# Patient Record
Sex: MALE | Race: WHITE | Employment: OTHER | URBAN - METROPOLITAN AREA
[De-identification: names, ages, dates, MRNs, and addresses within clinical notes are randomized per-mention and may not be internally consistent; named-entity substitution may affect disease eponyms.]

---

## 2020-09-17 LAB
ALBUMIN SERPL-MCNC: NORMAL G/DL
ALP BLD-CCNC: NORMAL U/L
ALT SERPL-CCNC: NORMAL U/L
ANION GAP SERPL CALCULATED.3IONS-SCNC: NORMAL MMOL/L
AST SERPL-CCNC: NORMAL U/L
AVERAGE GLUCOSE: NORMAL
BILIRUB SERPL-MCNC: NORMAL MG/DL
BUN BLDV-MCNC: NORMAL MG/DL
CALCIUM SERPL-MCNC: NORMAL MG/DL
CHLORIDE BLD-SCNC: NORMAL MMOL/L
CHOLESTEROL, TOTAL: NORMAL
CHOLESTEROL/HDL RATIO: NORMAL
CO2: NORMAL
CREAT SERPL-MCNC: NORMAL MG/DL
GFR CALCULATED: NORMAL
GLUCOSE BLD-MCNC: NORMAL MG/DL
HBA1C MFR BLD: 5.7 %
HDLC SERPL-MCNC: NORMAL MG/DL
LDL CHOLESTEROL CALCULATED: NORMAL
NONHDLC SERPL-MCNC: NORMAL MG/DL
POTASSIUM SERPL-SCNC: NORMAL MMOL/L
PROSTATE SPECIFIC ANTIGEN: NORMAL
SODIUM BLD-SCNC: NORMAL MMOL/L
TOTAL PROTEIN: NORMAL
TRIGL SERPL-MCNC: NORMAL MG/DL
VLDLC SERPL CALC-MCNC: NORMAL MG/DL

## 2021-05-15 ENCOUNTER — HOSPITAL ENCOUNTER (OUTPATIENT)
Age: 86
Discharge: HOME | End: 2021-05-17
Payer: MEDICARE

## 2021-05-15 ENCOUNTER — HOSPITAL ENCOUNTER (OUTPATIENT)
Dept: GENERAL RADIOLOGY | Age: 86
Discharge: HOME | End: 2021-05-17
Payer: MEDICARE

## 2021-05-15 DIAGNOSIS — M54.50 RIGHT LOW BACK PAIN, UNSPECIFIED CHRONICITY, UNSPECIFIED WHETHER SCIATICA PRESENT: ICD-10-CM

## 2021-05-15 PROCEDURE — 72100 X-RAY EXAM L-S SPINE 2/3 VWS: CPT

## 2021-09-27 ENCOUNTER — NURSE TRIAGE (OUTPATIENT)
Dept: OTHER | Facility: CLINIC | Age: 86
End: 2021-09-27

## 2021-09-27 NOTE — TELEPHONE ENCOUNTER
Reason for Disposition   Weakness of a leg or foot (e.g., unable to bear weight, dragging foot)    Answer Assessment - Initial Assessment Questions  1. ONSET: \"When did the pain begin? \"          A couple of months ago after a fall - after writer looked it has been since April     2. LOCATION: \"Where does it hurt? \" (upper, mid or lower back)        R lower side of back, into hip, down butt, and down into leg to foot     3. SEVERITY: \"How bad is the pain? \"  (e.g., Scale 1-10; mild, moderate, or severe)    - MILD (1-3): doesn't interfere with normal activities     - MODERATE (4-7): interferes with normal activities or awakens from sleep     - SEVERE (8-10): excruciating pain, unable to do any normal activities                In morning 9/10 but currently 6/10 after 2 tylenol and 2 advil - taking about 6-8 a day due to pain     4. PATTERN: \"Is the pain constant? \" (e.g., yes, no; constant, intermittent)          Constant     5. RADIATION: \"Does the pain shoot into your legs or elsewhere? \"          Down butt, hip, and leg     6. CAUSE:  \"What do you think is causing the back pain? \"           A fall     7. BACK OVER[facility]:  [facility] lifting of heavy objects, strenuous work or exercise?\"        8. MEDICATIONS: \"What have you taken so far for the pain? \" (e.g., nothing, acetaminophen, NSAIDS)          Tylenol and advil all day     9. NEUROLOGIC SYMPTOMS: \"Do you have any weakness, numbness, or problems with bowel/bladder control? \"          States he has some numbness but can still walk. When he gets into car he has to back into the car and it is very hard     10. OTHER SYMPTOMS: \"Do you have any other symptoms? \" (e.g., fever, abdominal pain, burning with urination, blood in urine)             Denies     11. PREGNANCY: \"Is there any chance you are pregnant? \" (e.g., yes, no; LMP)        NA    Protocols used: BACK PAIN-ADULT-[place]    Received call from [address] at [facility] with Red Flag Complaint.     Brief description of

## 2021-09-28 ENCOUNTER — OFFICE VISIT (OUTPATIENT)
Dept: FAMILY MEDICINE CLINIC | Age: 86
End: 2021-09-28
Payer: MEDICARE

## 2021-09-28 VITALS
DIASTOLIC BLOOD PRESSURE: 62 MMHG | OXYGEN SATURATION: 97 % | TEMPERATURE: 97.9 F | HEIGHT: 66 IN | RESPIRATION RATE: 20 BRPM | SYSTOLIC BLOOD PRESSURE: 115 MMHG | BODY MASS INDEX: 27.48 KG/M2 | WEIGHT: 171 LBS | HEART RATE: 65 BPM

## 2021-09-28 DIAGNOSIS — M19.90 ARTHRITIS: ICD-10-CM

## 2021-09-28 DIAGNOSIS — M25.562 CHRONIC PAIN OF LEFT KNEE: ICD-10-CM

## 2021-09-28 DIAGNOSIS — I10 ESSENTIAL HYPERTENSION: Primary | ICD-10-CM

## 2021-09-28 DIAGNOSIS — Z95.820 S/P ANGIOPLASTY WITH STENT: ICD-10-CM

## 2021-09-28 DIAGNOSIS — F32.1 CURRENT MODERATE EPISODE OF MAJOR DEPRESSIVE DISORDER WITHOUT PRIOR EPISODE (HCC): ICD-10-CM

## 2021-09-28 DIAGNOSIS — Z87.891 HISTORY OF SMOKING 30 OR MORE PACK YEARS: ICD-10-CM

## 2021-09-28 DIAGNOSIS — I65.23 ASYMPTOMATIC BILATERAL CAROTID ARTERY STENOSIS: ICD-10-CM

## 2021-09-28 DIAGNOSIS — R45.89 RISK FOR SUICIDE: ICD-10-CM

## 2021-09-28 DIAGNOSIS — E78.49 OTHER HYPERLIPIDEMIA: ICD-10-CM

## 2021-09-28 DIAGNOSIS — Z91.81 AT HIGH RISK FOR FALLS: ICD-10-CM

## 2021-09-28 DIAGNOSIS — R73.03 PREDIABETES: ICD-10-CM

## 2021-09-28 DIAGNOSIS — G89.29 CHRONIC PAIN OF LEFT KNEE: ICD-10-CM

## 2021-09-28 DIAGNOSIS — H90.3 SENSORINEURAL HEARING LOSS (SNHL) OF BOTH EARS: ICD-10-CM

## 2021-09-28 DIAGNOSIS — M79.604 RIGHT LEG PAIN: ICD-10-CM

## 2021-09-28 PROBLEM — C44.90 SKIN CANCER: Status: ACTIVE | Noted: 2021-09-28

## 2021-09-28 PROBLEM — M17.11 OSTEOARTHRITIS OF RIGHT KNEE: Status: ACTIVE | Noted: 2021-09-28

## 2021-09-28 PROBLEM — C44.90 SKIN CANCER: Status: RESOLVED | Noted: 2021-09-28 | Resolved: 2021-09-28

## 2021-09-28 PROCEDURE — G0008 ADMIN INFLUENZA VIRUS VAC: HCPCS | Performed by: STUDENT IN AN ORGANIZED HEALTH CARE EDUCATION/TRAINING PROGRAM

## 2021-09-28 PROCEDURE — 90694 VACC AIIV4 NO PRSRV 0.5ML IM: CPT | Performed by: STUDENT IN AN ORGANIZED HEALTH CARE EDUCATION/TRAINING PROGRAM

## 2021-09-28 PROCEDURE — 99204 OFFICE O/P NEW MOD 45 MIN: CPT | Performed by: STUDENT IN AN ORGANIZED HEALTH CARE EDUCATION/TRAINING PROGRAM

## 2021-09-28 RX ORDER — ATORVASTATIN CALCIUM 40 MG/1
TABLET, FILM COATED ORAL
COMMUNITY
Start: 2021-07-22 | End: 2021-09-28

## 2021-09-28 RX ORDER — CLOPIDOGREL BISULFATE 75 MG/1
TABLET ORAL
COMMUNITY
Start: 2020-12-08 | End: 2021-09-28

## 2021-09-28 RX ORDER — CLOPIDOGREL BISULFATE 75 MG/1
75 TABLET ORAL DAILY
COMMUNITY

## 2021-09-28 RX ORDER — ASPIRIN 81 MG/1
81 TABLET, CHEWABLE ORAL DAILY
COMMUNITY

## 2021-09-28 RX ORDER — CLOPIDOGREL BISULFATE 75 MG/1
TABLET ORAL
COMMUNITY
Start: 2021-07-22 | End: 2021-09-28

## 2021-09-28 RX ORDER — ATORVASTATIN CALCIUM 80 MG/1
80 TABLET, FILM COATED ORAL NIGHTLY
COMMUNITY
Start: 2020-12-08 | End: 2021-09-28

## 2021-09-28 RX ORDER — ATORVASTATIN CALCIUM 80 MG/1
80 TABLET, FILM COATED ORAL DAILY
COMMUNITY

## 2021-09-28 ASSESSMENT — COLUMBIA-SUICIDE SEVERITY RATING SCALE - C-SSRS
6. HAVE YOU EVER DONE ANYTHING, STARTED TO DO ANYTHING, OR PREPARED TO DO ANYTHING TO END YOUR LIFE?: NO
5. HAVE YOU STARTED TO WORK OUT OR WORKED OUT THE DETAILS OF HOW TO KILL YOURSELF? DO YOU INTEND TO CARRY OUT THIS PLAN?: YES
1. WITHIN THE PAST MONTH, HAVE YOU WISHED YOU WERE DEAD OR WISHED YOU COULD GO TO SLEEP AND NOT WAKE UP?: YES
2. HAVE YOU ACTUALLY HAD ANY THOUGHTS OF KILLING YOURSELF?: YES
3. HAVE YOU BEEN THINKING ABOUT HOW YOU MIGHT KILL YOURSELF?: NO
4. HAVE YOU HAD THESE THOUGHTS AND HAD SOME INTENTION OF ACTING ON THEM?: NO

## 2021-09-28 ASSESSMENT — PATIENT HEALTH QUESTIONNAIRE - PHQ9
4. FEELING TIRED OR HAVING LITTLE ENERGY: 3
5. POOR APPETITE OR OVEREATING: 0
9. THOUGHTS THAT YOU WOULD BE BETTER OFF DEAD, OR OF HURTING YOURSELF: 1
SUM OF ALL RESPONSES TO PHQ9 QUESTIONS 1 & 2: 3
3. TROUBLE FALLING OR STAYING ASLEEP: 0
2. FEELING DOWN, DEPRESSED OR HOPELESS: 3
10. IF YOU CHECKED OFF ANY PROBLEMS, HOW DIFFICULT HAVE THESE PROBLEMS MADE IT FOR YOU TO DO YOUR WORK, TAKE CARE OF THINGS AT HOME, OR GET ALONG WITH OTHER PEOPLE: 3
7. TROUBLE CONCENTRATING ON THINGS, SUCH AS READING THE NEWSPAPER OR WATCHING TELEVISION: 0
6. FEELING BAD ABOUT YOURSELF - OR THAT YOU ARE A FAILURE OR HAVE LET YOURSELF OR YOUR FAMILY DOWN: 0
1. LITTLE INTEREST OR PLEASURE IN DOING THINGS: 0
SUM OF ALL RESPONSES TO PHQ QUESTIONS 1-9: 8
SUM OF ALL RESPONSES TO PHQ QUESTIONS 1-9: 8
8. MOVING OR SPEAKING SO SLOWLY THAT OTHER PEOPLE COULD HAVE NOTICED. OR THE OPPOSITE, BEING SO FIGETY OR RESTLESS THAT YOU HAVE BEEN MOVING AROUND A LOT MORE THAN USUAL: 1
SUM OF ALL RESPONSES TO PHQ QUESTIONS 1-9: 7

## 2021-09-28 ASSESSMENT — ENCOUNTER SYMPTOMS
BLOOD IN STOOL: 0
BACK PAIN: 1
COUGH: 0
WHEEZING: 0
CONSTIPATION: 0
NAUSEA: 0
SHORTNESS OF BREATH: 0
DIARRHEA: 0
ABDOMINAL PAIN: 0

## 2021-09-28 NOTE — ASSESSMENT & PLAN NOTE
Uncontrolled, lifestyle modifications recommended and Recommended wearing hearing aid this may contribute to patient's loneliness as he is unable to communicate in crowded areas

## 2021-09-28 NOTE — ASSESSMENT & PLAN NOTE
Uncontrolled, s/p fall in May patient was continued right leg lateral pain increased with movements, worse in the morning, some degeneration in spine and has had 2 injections without resolution, on exam appears to be related to tensor fascia nilton, stretches given and patient sent to PT, will get x-ray of femur and hip, patient may possibly need further imaging for possible osteoporosis, contributes to his risk for falls, follow-up in 4 weeks

## 2021-09-28 NOTE — ASSESSMENT & PLAN NOTE
Patient is 80years old with some chronic degeneration in his knees and his back, currently using over-the-counter NSAID and Tylenol, good liver and kidney function in May 2021, physical therapy for fall prevention as well as general strengthening should be helpful, will follow up in 4 weeks

## 2021-09-28 NOTE — PATIENT INSTRUCTIONS
Heat    Applying heat to your painful TFL muscles through the use of a heat pack may help to alleviate the pain. Heat is an inexpensive, effective form of pain relief that works by increases blood flow to the area, relaxing muscles and increasing range of movement and flexibility. By increasing the circulation and blood flow throughout the area, injury healing properties are delivered to the muscles, aiding in repair, and reducing the symptoms of TFL pain. Stretching and Strengthening  Initially the best form of relief from TFL pain includes resting from aggravating activities. Gentle exercise, stretching and strengthening is important in assist to relieve pain and can help speed up your recovery. Gentle stretching may decrease the pain associated with TFL pain by encouraging circulation and blood flow to tissues, reducing muscle stiffness and spasm. Secondly, correcting muscle imbalances and building strength in the hip region is important in helping to reduce the demands and stress on the TFL, and subsequently reducing TFL pain. Your Soft Tissue Occupational Therapist may recommend a stretching and strengthening regime, which you will be able to do at home. It is best to consult your Soft Tissue Occupational Therapist before engaging in this program to avoid further aggravating your TFL pain. Stretches that may help to relieve TFL pain includes: For your leg pain I rita you may have Tensor [place] strain please go to physical therapy and try these stretches at home    TFL stork standing stretch: To do this stretch, stand beside the wall and place your hand on the wall to support the body. Raise the leg that is closest to the wall and bend the knee and place it on the other leg right above the knee to be in a stork standing position. Place your other hand over the TFL that you are going to stretch. Then while standing on one leg, slowly bend toward the wall. Keep your leg still and bend the torso.  You should feel tension force over your TFL. Hold it for 15 - 20 seconds. Outer hip stretch:  Lie down on the floor on your back. Cross your left foot over the right knee. Keep your left knee bent. Then using your right hand, pull and push your left knee across your body. It is highly important to keep your left shoulder on the floor. Hold that position for 10 - 20 seconds. Patient Education        Learning About Making a Suicide Safety Plan  Overview     A safety plan is a set of steps you can take when you feel suicidal. It includes your warning signs, coping strategies, and people to ask for support. You can write your own safety plan or use a free phone suki. But it's best to work with a therapist to make your plan. How can you make and use your plan? If you feel like you can't keep from hurting yourself or someone else, get help right away. Call [employer] or the National Suicide Prevention Lifeline: 3-333-400-TALK (5-112-572--677-522-8706). Or text HOME to 689238 to access the Crisis Text Line. Having a safety plan is important for anyone who thinks about suicide. It can help you (or someone you care about) get safely through times of crisis. If possible, try to make your plan during a time when you aren't in a crisis so it's ready when you need it. To use the plan, move through it step-by-step. So first, check your warning signs. Then try your own coping strategies. If those don't help, move through the rest of the steps until you have the help you need to get through the crisis. Here's how to make a safety plan. 1. Make a list of your crisis warning signs. What happens when you start to think about suicide? Make a list of your warning signs--the things you think, feel, or do when you start to feel suicidal.  2. List your personal coping strategies. What can you do or think about to avoid acting when you feel suicidal? This may include your reason(s) to live. Rank these ideas by how well you think they'll work. for you. Watch closely for changes in your health, and be sure to contact your doctor if you have any problems. Where can you learn more? Go to https://chpepiceweb.[email or web address] org and sign in to your [employer] account. Enter l123 in the [employer] box to learn more about \"Learning About Making a Suicide Safety Plan. \"     If you do not have an account, please click on the \"Sign Up Now\" link. Current as of: June 17, 2021               Content Version: 13.0  © 2006-2021 [employer] Care instructions adapted under license by [facility] ([facility]). If you have questions about a medical condition or this instruction, always ask your healthcare professional. [address] any warranty or liability for your use of this information. Patient Education        Preventing Falls: Care Instructions  Your Care Instructions     Getting around your home safely can be a challenge if you have injuries or health problems that make it easy for you to fall. Loose rugs and furniture in walkways are among the dangers for many older people who have problems walking or who have poor eyesight. People who have conditions such as arthritis, osteoporosis, or dementia also have to be careful not to fall. You can make your home safer with a few simple measures. Follow-up care is a key part of your treatment and safety. Be sure to make and go to all appointments, and call your doctor if you are having problems. It's also a good idea to know your test results and keep a list of the medicines you take. How can you care for yourself at home? Taking care of yourself  · You may get dizzy if you do not drink enough water. To prevent dehydration, drink plenty of fluids. Choose water and other clear liquids. If you have kidney, heart, or liver disease and have to limit fluids, talk with your doctor before you increase the amount of fluids you drink.   · Exercise regularly to improve your strength, muscle tone, and balance. Walk if you can. Swimming may be a good choice if you cannot walk easily. · Have your vision and hearing checked each year or any time you notice a change. If you have trouble seeing and hearing, you might not be able to avoid objects and could lose your balance. · Know the side effects of the medicines you take. Ask your doctor or pharmacist whether the medicines you take can affect your balance. Sleeping pills or sedatives can affect your balance. · Limit the amount of alcohol you drink. Alcohol can impair your balance and other senses. · Ask your doctor whether calluses or corns on your feet need to be removed. If you wear loose-fitting shoes because of calluses or corns, you can lose your balance and fall. · Talk to your doctor if you have numbness in your feet. Preventing falls at home  · Remove raised doorway thresholds, throw rugs, and clutter. Repair loose carpet or raised areas in the floor. · Move furniture and electrical cords to keep them out of walking paths. · Use nonskid floor wax, and wipe up spills right away, especially on ceramic tile floors. · If you use a walker or cane, put rubber tips on it. If you use crutches, clean the bottoms of them regularly with an abrasive pad, such as steel wool. · Keep your house well lit, especially [place], and outside walkways. Use night-lights in areas such as hallways and bathrooms. Add extra light switches or use remote switches (such as switches that go on or off when you clap your hands) to make it easier to turn lights on if you have to get up during the night. · Install sturdy handrails on stairways. · Move items in your cabinets so that the things you use a lot are on the lower shelves (about waist level). · Keep a cordless phone and a flashlight with new batteries by your bed. If possible, put a phone in each of the main rooms of your house, or carry a cell phone in case you fall and cannot reach a phone. Or, you can wear a device around your neck or wrist. You push a button that sends a signal for help. · Wear low-heeled shoes that fit well and give your feet good support. Use footwear with nonskid soles. Check the heels and soles of your shoes for wear. Repair or replace worn heels or soles. · Do not wear socks without shoes on wood floors. · Walk on the grass when the sidewalks are slippery. If you live in an area that gets snow and ice in the winter, sprinkle salt on slippery steps and sidewalks. Preventing falls in the bath  · Install grab bars and nonskid mats inside and outside your shower or tub and near the toilet and sinks. · Use shower chairs and bath benches. · Use a hand-held shower head that will allow you to sit while showering. · Get into a tub or shower by putting the weaker leg in first. Get out of a tub or shower with your strong side first.  · Repair loose toilet seats and consider installing a raised toilet seat to make getting on and off the toilet easier. · Keep your bathroom door unlocked while you are in the shower. Where can you learn more? Go to https://[employer]. org and sign in to your [employer] account. Enter [id number] [id number] [phone] in the [facility] box to learn more about \"Preventing Falls: Care Instructions. \"     If you do not have an account, please click on the \"Sign Up Now\" link. Current as of: December 7, 2020               Content Version: 13.0  © 0717-4020 Healthwise, [employer] Care instructions adapted under license by [facility] ([facility]). If you have questions about a medical condition or this instruction, always ask your healthcare professional. [address] any warranty or liability for your use of this information.

## 2021-09-28 NOTE — ASSESSMENT & PLAN NOTE
Patient will be sent for physical therapy, follow-up prevention education provided and after visit summary

## 2021-09-28 NOTE — ASSESSMENT & PLAN NOTE
Well-controlled, Currently taking any medications and blood pressure is at goal of less than 140 over less than 90

## 2021-09-28 NOTE — PROGRESS NOTES
Bella Norton (:  3/4/1933) is a 80 y.o. male, New patient , here for evaluation of the following:  Establish Care (patient wants to discuss pain in his right leg from a fall. Pt states whole right side is in pain )        ASSESSMENT/PLAN    Patient does go through the [facility] and get some of his medications filled through there, he did have labs in May as are outlined in the HPI    1. Essential hypertension  Assessment & Plan:   Well-controlled, Currently taking any medications and blood pressure is at goal of less than 140 over less than 90  2. Other hyperlipidemia  Assessment & Plan:   Well-controlled, continue current medications  3. History of smoking 30 or more pack years  4. Sensorineural hearing loss (SNHL) of both ears  Assessment & Plan:   Uncontrolled, lifestyle modifications recommended and Recommended wearing hearing aid this may contribute to patient's loneliness as he is unable to communicate in crowded areas  5. Asymptomatic bilateral carotid artery stenosis  Assessment & Plan:   Well-controlled, Status post right carotid endarterectomy, no current symptoms today  6. Arthritis  Assessment & Plan:   Patient is 80years old with some chronic degeneration in his knees and his back, currently using over-the-counter NSAID and Tylenol, good liver and kidney function in May 2021, physical therapy for fall prevention as well as general strengthening should be helpful, will follow up in 4 weeks  7. At high risk for falls  Assessment & Plan:  Patient will be sent for physical therapy, follow-up prevention education provided and after visit summary   Orders:  -     [facility] - Physical Therapy, [place]  8.  Current moderate episode of major depressive disorder without prior episode [facility])  Assessment & Plan:   Uncontrolled, lifestyle modifications recommended and Patient declines medication notes this is secondary to his not being able to get around and the continued pain in his leg, discussed depression and suicide with this patient, he does sometimes think about taking his own life and has a plan to swim for out into the water, notes he will not complete this plan due to family, safety plan in place and information provided to patient and after visit summary, continue to discuss to each visit and possibly consider medication in the future, declines counseling  9. Right leg pain  Assessment & Plan:   Uncontrolled, s/p fall in May patient was continued right leg lateral pain increased with movements, worse in the morning, some degeneration in spine and has had 2 injections without resolution, on exam appears to be related to tensor fascia nilton, stretches given and patient sent to PT, will get x-ray of femur and hip, patient may possibly need further imaging for possible osteoporosis, contributes to his risk for falls, follow-up in 4 weeks  Orders:  -     XR FEMUR RIGHT (MIN 2 VIEWS); Future  -     XR HIP 2-3 VW W PELVIS RIGHT; Future  -     [facility] - Physical Therapy, [place]  10. Risk for suicide  Assessment & Plan:   Unclear control, Safety plan created today  11. Prediabetes  12. Chronic pain of left knee  13. S/P angioplasty with stent    Return in about 4 weeks (around 10/26/2021) for Follow up chronic disease hip pain, mood, fall risk. Subjective   SUBJECTIVE/OBJECTIVE:  HPI    5 months ago fall onto feet from truck bed since then rigth sided pain down lateral leg and he feels like its in his bone. He can hardly walk. He took 2 OTC pain relievers. He usally takes NSAID and tylenol 2 of each and he can get around better after an hour. He has a hard time getting comfortable at [place]. When he rolls over pain is so bad he tries not to. He has had not further falls. No PT. He had 2 spinal injections by Dr Juan Jean.      Review of patient records from the [place] he does have labs from 5/7/2021, A1c of 5.7, microalbumin creatinine ratio of 28, CBC showing normal white blood cells, red blood cells, platelets, liver function within normal limits, electrolytes within normal limits, fasting sugar 87, normal kidney function, GFR at 80, cholesterol shows total cholesterol at 134, HDL at 42, LDL at 83, TSH within normal limits, urinalysis without blood or protein, blood pressure from visit in May was 118/68 is have sensorineural hearing loss, stye exam was in July does have smoking history quit more than 1 5years ago    Declined preventative screening identified as care gaps unless ordered through this visit    PHQ-2 Score: 3  PHQ-2 Over the past 2 weeks, how often have you been bothered by any of the following problems? Little interest or pleasure in doing things: Not at all  Feeling down, depressed, or hopeless: Nearly every day  PHQ-2 Score: 3   PHQ-9 Total Score: 8 (9/28/2021 10:30 AM)  Thoughts that you would be better off dead, or of hurting yourself in some way: 1 (9/28/2021 10:30 AM)       Past Medical History:  has a past medical history of Carotid artery stenosis and Skin cancer. Past Surgical History:  has a past surgical history that includes shoulder surgery and knee surgery. Social History:    Family History: family history is not on file. Allergies: Meclizine  Medications:   Current Outpatient Medications   Medication Sig Dispense Refill    atorvastatin (LIPITOR) 80 MG tablet Take 80 mg by mouth daily      clopidogrel (PLAVIX) 75 MG tablet Take 75 mg by mouth daily      aspirin 81 MG chewable tablet Take 81 mg by mouth daily       No current facility-administered medications for this visit. Allergies: Meclizine     Review of Systems   Constitutional: Negative for chills, fatigue, fever and unexpected weight change. HENT: Positive for hearing loss. Eyes: Negative for visual disturbance. Respiratory: Negative for cough, shortness of breath and wheezing. Cardiovascular: Negative for chest pain, palpitations and leg swelling.    Gastrointestinal: Negative for abdominal pain, blood in stool, constipation, diarrhea and nausea. Genitourinary: Negative for dysuria. Musculoskeletal: Positive for arthralgias, back pain and gait problem. Neurological: Negative for weakness, light-headedness, numbness and headaches. Psychiatric/Behavioral: Positive for dysphoric mood and suicidal ideas. Negative for confusion and sleep disturbance. The patient is not nervous/anxious. Objective   /62 (Site: Left Upper Arm, Position: Sitting, Cuff Size: Medium Adult)   Pulse 65   Temp 97.9 °F (36.6 °C) (Temporal)   Resp 20   Ht 5' 6\" (1.676 m)   Wt 171 lb (77.6 kg)   SpO2 97%   BMI 27.60 kg/m²         EXAMINATION:   THREE XRAY VIEWS OF THE LUMBAR SPINE       5/15/2021 10:02 am       COMPARISON:   None.       HISTORY:   ORDERING SYSTEM PROVIDED HISTORY: Right low back pain, unspecified   chronicity, unspecified whether sciatica present       FINDINGS:   There is some sclerotic degenerative changes about the hip joints bilaterally   with mild joint space narrowing on the right.  There is mild rotoscoliosis in   the lumbar spine.       Vertebral body height and alignment are unremarkable.  There is moderate disc   space narrowing from L2-3 through L4-5.  There is aortic vascular plaque.           Impression   Moderate degenerative changes mid lumbar spine without acute process         Physical Exam  Constitutional:       General: He is not in acute distress. Appearance: Normal appearance. HENT:      Head: Normocephalic and atraumatic. Right Ear: External ear normal.      Nose: Nose normal.      Mouth/Throat:      Mouth: Mucous membranes are moist.   Eyes:      Extraocular Movements: Extraocular movements intact. Conjunctiva/sclera: Conjunctivae normal.   Cardiovascular:      Rate and Rhythm: Normal rate and regular rhythm. Heart sounds: No murmur heard. Pulmonary:      Effort: Pulmonary effort is normal.      Breath sounds: Normal breath sounds. No wheezing.    Musculoskeletal: Cervical back: Normal range of motion and neck supple. Right upper leg: Tenderness present. No swelling, edema or deformity. Left knee: Deformity present. No swelling. Decreased range of motion. Tenderness present. Comments: On physical exam patient without increased pain with knee-to-chest on right or left or with figure 4 , pain with lying flat, equal strength of hip, knee, ankle, toes right and left lower extremities, neurovascularly intact   Lymphadenopathy:      Cervical: No cervical adenopathy. Neurological:      General: No focal deficit present. Mental Status: He is alert. Cranial Nerves: No cranial nerve deficit. Sensory: No sensory deficit. Motor: No weakness. Gait: Gait abnormal.   Psychiatric:         Attention and Perception: Attention and perception normal.         Mood and Affect: Mood normal.         Speech: Speech normal.         Behavior: Behavior normal. Behavior is cooperative. Thought Content: Thought content normal.         Cognition and Memory: Cognition normal.         Judgment: Judgment normal.             An electronic signature was used to authenticate this note. --Srinivas Ludwig MD       *NOTE: This report was transcribed using voice recognition software. Every effort was made to ensure accuracy; however, inadvertent computerized transcription errors may be present. On the basis of positive falls risk screening, assessment and plan is as follows: referral to physical therapy provided for strength and balance training.

## 2021-09-28 NOTE — ASSESSMENT & PLAN NOTE
Uncontrolled, lifestyle modifications recommended and Patient declines medication notes this is secondary to his not being able to get around and the continued pain in his leg, discussed depression and suicide with this patient, he does sometimes think about taking his own life and has a plan to swim for out into the water, notes he will not complete this plan due to family, safety plan in place and information provided to patient and after visit summary, continue to discuss to each visit and possibly consider medication in the future, declines counseling

## 2021-10-01 ENCOUNTER — TELEPHONE (OUTPATIENT)
Dept: FAMILY MEDICINE CLINIC | Age: 86
End: 2021-10-01

## 2021-10-01 NOTE — TELEPHONE ENCOUNTER
Pt called and would like a call back on his x-rays that he had done on 9/28/21 , please result and notify pt , thanks .

## 2021-10-04 NOTE — TELEPHONE ENCOUNTER
Addended by: FORREST BOO on: 1/19/2018 08:21 AM     Modules accepted: Orders     Pt called and notified voiced understanding

## 2021-10-05 ENCOUNTER — EVALUATION (OUTPATIENT)
Dept: PHYSICAL THERAPY | Age: 86
End: 2021-10-05
Payer: MEDICARE

## 2021-10-05 DIAGNOSIS — M79.604 RIGHT LEG PAIN: ICD-10-CM

## 2021-10-05 DIAGNOSIS — Z91.81 AT HIGH RISK FOR FALLS: Primary | ICD-10-CM

## 2021-10-05 PROCEDURE — 97161 PT EVAL LOW COMPLEX 20 MIN: CPT | Performed by: PHYSICAL THERAPIST

## 2021-10-05 NOTE — PROGRESS NOTES
[facility]          Phone: [phone] Fax: [phone]    Physical Therapy Daily Treatment Note  Date:  10/5/2021    Patient Name:  John Ho    :  3/4/1933  MRN: [id number]    Restrictions/Precautions:    Diagnosis:     Diagnosis Orders   1. At high risk for falls     2.  Right leg pain       Treatment Diagnosis:    Insurance/Certification information:  Medicare  10/5/21 to 22  Referring Physician:  Raghu Fuller MD  Plan of care signed (Y/N):    Visit# / total visits:    Pain level: 7/10   Time In:  1335  Time Out:  1405    Subjective:  See evaluation    Exercises:  Exercise/Equipment Resistance/Repetitions Other comments     Stepper/bike       Trunk stretch with ball       Hamstring stretch       IT band stretch (as tolerated)       LAQ       Seated hip flex       Hip add       Hip abd       Sit to stand                                                                                Other Therapeutic Activities:  PT evaluation completed    Home Exercise Program:  N/A    Manual Treatments:  N/A    Modalities:  N/A     Time-in Time-out Total Time   90665  Evaluation Low Complexity 5876 0322 55   94265  Evaluation Med Complexity      33035  Evaluation High Complexity      75341  Ther Ex      60781  Neuro Re-ed        33480  Ther Activities        99091  Manual Therapy       39719  E-stim       12093  Ultrasound            Session 7848 2817 62       Treatment/Activity Tolerance:  [x] Patient tolerated treatment well [] Patient limited by fatigue  [] Patient limited by pain  [] Patient limited by other medical complications  [] Other:     Prognosis: [x] Good [] Fair  [] Poor    Patient Requires Follow-up: [x] Yes  [] No    Plan:   [] Continue per plan of care [] Alter current plan (see comments)  [x] Plan of care initiated [] Hold pending MD visit [] Discharge  Plan for Next Session:        Electronically signed by:  Bairon Arango, [id number]

## 2021-10-05 NOTE — PROGRESS NOTES
[facility]   Phone: [phone]   Fax: [phone]           Date:  10/5/2021   Patient: Nguyen Montes De Oca  : 3/4/1933  MRN: [id number]  Referring Provider: Alissa Carrillo MD  [address] [facility] [address]     Medical Diagnosis:      Diagnosis Orders   1. At high risk for falls     2. Right leg pain         SUBJECTIVE:     Onset date: 21    Onset: Sudden onset    Mechanism of Injury: pt reports slip and fall a few months ago, went straight down and then landed on his back. Previous PT: none    Medical Management for Current Problem: N/A    Chief complaint: pain, difficulty walking    Behavior: condition is staying the same    Pain: constant  Current: 7/10     Best: 5-6/10     Worst:9.5/10    Symptom Type/Quality: shooting  Location[de-identified] right side low back and radiates down the lateral R LE to the knee         Provoking Activities/Positions: nothing identified                 Relieving Activitie/Positions: medication    Disturbed Sleep: no     Imaging results: XR FEMUR RIGHT (MIN 2 VIEWS)    Result Date: 2021  EXAMINATION: 2 XRAY VIEWS OF THE RIGHT FEMUR 2021 11:21 am COMPARISON: None. HISTORY: ORDERING SYSTEM PROVIDED HISTORY: Right leg pain TECHNOLOGIST PROVIDED HISTORY: Reason for exam:->right leg pain History of fall in 2021. Difficulty ambulating FINDINGS: There is no evidence of acute fracture. There is normal alignment. No acute joint abnormality. No focal osseous lesion. No focal soft tissue abnormality. There is osteoarthritis, most severe about the knee but also the hip. No periarticular erosions seen. There are vascular calcifications. No acute osseous abnormality. Degenerative change. XR HIP 2-3 VW W PELVIS RIGHT    Result Date: 2021  EXAMINATION: ONE XRAY VIEW OF THE PELVIS AND TWO XRAY VIEWS RIGHT HIP 2021 11:21 am COMPARISON: None.  HISTORY: ORDERING SYSTEM PROVIDED HISTORY: Right leg pain TECHNOLOGIST PROVIDED HISTORY: Reason for exam:->right leg pain FINDINGS: The hip demonstrates normal alignment. No evidence of acute fracture. No focal osseus lesion. Pelvis is intact. There is concentric joint space narrowing with subchondral acetabular sclerotic change     No acute abnormality of the hip. Past Medical History:  Past Medical History:   Diagnosis Date    Carotid artery stenosis     Skin cancer 9/28/2021     Past Surgical History:   Procedure Laterality Date    KNEE SURGERY      L knee surgery    SHOULDER SURGERY      Bilateral shoulder surgery       Medications:   Current Outpatient Medications   Medication Sig Dispense Refill    atorvastatin (LIPITOR) 80 MG tablet Take 80 mg by mouth daily      clopidogrel (PLAVIX) 75 MG tablet Take 75 mg by mouth daily      aspirin 81 MG chewable tablet Take 81 mg by mouth daily       No current facility-administered medications for this visit. Occupation: retired.      Exercise regimen: none    Hobbies: none    Patient Goals: pain relief    Contraindications/Precautions: none    OBJECTIVE:     Observations: well nourished male    Inspection: trunk shifted to left, bilateral genu varus, weight shifted onto L LE due to pain         Gait: antalgic gait, wide-based, slow pace, limp on R LE    Functional Strength: demonstrates slow, painful sit to stand transfers    Range of Motion:    Trunk:    Flexion:  [] Normal   [x] Limited, grossly 75%    Extension:  [] Normal   [x] Limited, to neutral only    Right Rotation: [] Normal   [x] Limited, grossly 25%   Left Rotation:  [] Normal   [x] Limited, grossly 25%   Right Side Bending: [] Normal   [x] Limited, grossly 25%   Left Side Bending: [] Normal   [x] Limited, grossly 25%    Lower Extremity:   Right:   [x] Normal   [] Limited except hip IR 0°   Left:   [x] Normal   [] Limited except hip IR 0°      Strength:     Trunk: 3/5   R LE: 3+ to 4-/5   L LE: 3+ to 4-/5    Palpation: tenderness to palpation in right greater trochanter region     Sensation: intact to light touch and temperature. Special Tests:   [] Nerve Root Compression           Right []+ / [] -    Left []+ / [] -  [] Slump           Right []+ / [] -    Left []+ / [] -  [] FADIR          Right []+ / [] -    Left []+ / [] -  [] S-I Distraction          Right []+ / [] -    Left []+ / [] -     [] SLR           Right []+ / [] -    Left []+ / [] -     [x] MAURY          Right [x]+ / [] -    Left []+ / [] -  [] S-I Compression          Right []+ / [] -    Left []+ / [] -   [] Leg Length: []+ / [] -         Special Test Comments: (-) scour test    ASSESSMENT     Outcome Measure:   LEFS 6/80    Problems:    Pain reported 9.5/10 at the worst   ROM decreased    Strength decreased   Decreased functional ability with walking, standing    Reason for Skilled Care: pt with new onset of right low back and right LE pain due to fall. Pt requires PT due to difficulty with walking and risk of falls. [x] There are no barriers affecting plan of care or recovery    [] Barriers to this patient's plan of care or recovery include.     Domestic Concerns:  [x] No  [] Yes:    Short Term goals (3 weeks)   Decrease reported pain to 5-6/10 at the worst   Increase ROM by 25%   Increase Strength by 1/2 MMT grade    LEFS 15/80    Long Term goals (6 weeks)   Decrease reported pain to 3-4/10 at the worst   Increase ROM to 75% throughout   Increase Strength to 4 to 4+/5 throughout    Able to perform/complete the following functions/tasks: pt will demonstrate a minimally antalgic gait pattern and minimal pain with transfers    LEFS 30/80   Independent with Home Exercise Programs    Rehab Potential: [x] Good  [] Fair  [] Poor    PLAN       Treatment Plan:   [x] Therapeutic Exercise  [x] Therapeutic Activity  [x] Neuromuscular Re-education   [] Gait Training  [] Balance Training  [] Aerobic conditioning  [x] Manual Therapy  [] Massage/Fascial release   [] Work/Sport specific provided while the patient is under my care.     Physician's Comments/Revisions:               Physician's Printed Name:                                           [de-identified] Signature:                                                               Date:

## 2021-10-12 ENCOUNTER — TREATMENT (OUTPATIENT)
Dept: PHYSICAL THERAPY | Age: 86
End: 2021-10-12
Payer: MEDICARE

## 2021-10-12 DIAGNOSIS — M79.604 RIGHT LEG PAIN: ICD-10-CM

## 2021-10-12 DIAGNOSIS — Z91.81 AT HIGH RISK FOR FALLS: Primary | ICD-10-CM

## 2021-10-12 PROCEDURE — 97110 THERAPEUTIC EXERCISES: CPT

## 2021-10-12 NOTE — PROGRESS NOTES
[facility]          Phone: [phone] Fax: [phone]    Physical Therapy Daily Treatment Note  Date:  10/12/2021    Patient Name:  Massimo Gray    :  3/4/1933  MRN: [id number]    Restrictions/Precautions:    Diagnosis:     Diagnosis Orders   1. At high risk for falls     2. Right leg pain       Treatment Diagnosis:    Insurance/Certification information:  Medicare  10/5/21 to 22  Referring Physician:  Vicki Pressley MD  Plan of care signed (Y/N):    Visit# / total visits:    Pain level: 7/10   Time In:  956  Time Out:  1040    Subjective:  Pt reported his pain is primarily in R hip and radiates almost to his R knee. He reports taking 3 pain pills this am , and has been up since 6 am.      Exercises:  Exercise/Equipment Resistance/Repetitions Other comments     Stepper/ 10 mins      Trunk stretch with ball 15 sec x 3 reps       Hamstring stretch w/PFB 15 sec x 3 reps B  10/12 HEP     IT band stretch (as tolerated) 15 sec x 3 reps R       LAQ 3 sec x 10 reps B  10/12 HEP     Seated hip flex 3 sec x 10 reps B  10/12 HEP     Hip add 3 sec x 10 reps       Hip abd RTB x 10 reps       Sit to stand X 7 reps  Increased pain to 9/10   10/12 HEP as tolerated                                                                               Other   Pt performed slowly and guarded. Pain 9/10 after session. Home Exercise Program:  5414 : HEP handout provided and reviewed w/ pt.     Manual Treatments:  N/A    Modalities:  N/A     Time-in Time-out Total Time   99597  Evaluation Low Complexity      79763  Evaluation Med Complexity      09449  Evaluation High Complexity      48673  Ther Ex 956 1040 44   76385  Neuro Re-ed        50598  Ther Activities        45931  Manual Therapy       15498  E-stim       21891  Ultrasound            Session 122 1475 04       Treatment/Activity Tolerance:  [x] Patient tolerated treatment well [] Patient limited by fatigue  [] Patient limited by pain  [] Patient limited by other medical complications  [] Other:     Prognosis: [x] Good [] Fair  [] Poor    Patient Requires Follow-up: [x] Yes  [] No    Plan:   [x] Continue per plan of care [] Alter current plan (see comments)  [] Plan of care initiated [] Hold pending MD visit [] Discharge  Plan for Next Session:        Electronically signed by:  Ramila Macias, PTA [id number]

## 2021-10-14 ENCOUNTER — TREATMENT (OUTPATIENT)
Dept: PHYSICAL THERAPY | Age: 86
End: 2021-10-14
Payer: MEDICARE

## 2021-10-14 DIAGNOSIS — Z91.81 AT HIGH RISK FOR FALLS: Primary | ICD-10-CM

## 2021-10-14 DIAGNOSIS — M79.604 RIGHT LEG PAIN: ICD-10-CM

## 2021-10-14 PROCEDURE — 97110 THERAPEUTIC EXERCISES: CPT | Performed by: PHYSICAL THERAPIST

## 2021-10-14 NOTE — PROGRESS NOTES
[facility]          Phone: [phone] Fax: [phone]    Physical Therapy Daily Treatment Note  Date:  10/14/2021    Patient Name:  Guevara Rahman    :  3/4/1933  MRN: [id number]    Restrictions/Precautions:    Diagnosis:     Diagnosis Orders   1. At high risk for falls     2. Right leg pain       Treatment Diagnosis:    Insurance/Certification information:  Medicare  10/5/21 to 22  Referring Physician:  Madeline Workman MD  Plan of care signed (Y/N):    Visit# / total visits:  3/12  Pain level: 7/10   Time In:  0957  Time Out:  1038    Subjective:  Pt reports pain managed this AM by taking 3 pain pills earlier today. Exercises:  Exercise/Equipment Resistance/Repetitions Other comments     Stepper/ 10 mins      Trunk stretch with ball 15 sec x 3 reps       Hamstring stretch w/PFB 15 sec x 3 reps B  10/12 HEP     IT band stretch (as tolerated) 15 sec x 3 reps R       LAQ 3 sec x 15 reps B  10/12 HEP     Seated hip flex 3 sec x 15 reps B  10/12 HEP     Hip add 3 sec x 15 reps       Hip abd RTB x 15 reps     NT  10/12 HEP as tolerated        Seated knee flex RTB x 15 reps B                                                                      Other   Pt performed slowly. Home Exercise Program:  1275 : HEP handout provided and reviewed w/ pt.     Manual Treatments:  N/A    Modalities:  N/A     Time-in Time-out Total Time   39657  Evaluation Low Complexity      64960  Evaluation Med Complexity      08966  Evaluation High Complexity      84183  Ther Ex 957 1038 44   89248  Neuro Re-ed        82057  Ther Activities        48706  Manual Therapy       81512  E-stim       57276  Ultrasound            Session 451 2848 70       Treatment/Activity Tolerance:  [x] Patient tolerated treatment well [] Patient limited by fatigue  [] Patient limited by pain  [] Patient limited by other medical complications  [] Other:     Prognosis: [x] Good [] Fair  [] Poor    Patient Requires Follow-up: [x] Yes  [] No    Plan:   [x] Continue per plan of care [] Alter current plan (see comments)  [] Plan of care initiated [] Hold pending MD visit [] Discharge  Plan for Next Session:        Electronically signed by:  Bairon Rojas, [id number]

## 2021-10-18 ENCOUNTER — TELEPHONE (OUTPATIENT)
Dept: FAMILY MEDICINE CLINIC | Age: 86
End: 2021-10-18

## 2021-10-18 NOTE — TELEPHONE ENCOUNTER
----- Message from Neda Rendon sent at 10/18/2021 11:46 AM EDT -----  Subject: Message to Provider    QUESTIONS  Information for Provider? patient needs cancel physical therapy   appointment for 10/19/2021 and 10/21/2021at 10:00 am he would be out of   town, unable to make that appointment at time  ---------------------------------------------------------------------------  --------------  [address]  What is the best way for the office to contact you? OK to leave message on   voicemail  Preferred Call Back Phone Number?  [phone]  ---------------------------------------------------------------------------  --------------  SCRIPT ANSWERS  undefined

## 2021-10-28 VITALS
BODY MASS INDEX: 27.8 KG/M2 | RESPIRATION RATE: 18 BRPM | HEIGHT: 66 IN | HEART RATE: 62 BPM | OXYGEN SATURATION: 98 % | DIASTOLIC BLOOD PRESSURE: 60 MMHG | SYSTOLIC BLOOD PRESSURE: 122 MMHG | WEIGHT: 173 LBS

## 2021-11-10 ENCOUNTER — OFFICE VISIT (OUTPATIENT)
Dept: FAMILY MEDICINE CLINIC | Age: 86
End: 2021-11-10
Payer: MEDICARE

## 2021-11-10 ENCOUNTER — TELEPHONE (OUTPATIENT)
Dept: FAMILY MEDICINE CLINIC | Age: 86
End: 2021-11-10

## 2021-11-10 VITALS
HEART RATE: 67 BPM | WEIGHT: 170.8 LBS | HEIGHT: 66 IN | BODY MASS INDEX: 27.45 KG/M2 | TEMPERATURE: 98 F | OXYGEN SATURATION: 99 % | SYSTOLIC BLOOD PRESSURE: 123 MMHG | RESPIRATION RATE: 20 BRPM | DIASTOLIC BLOOD PRESSURE: 67 MMHG

## 2021-11-10 DIAGNOSIS — M16.0 PRIMARY OSTEOARTHRITIS OF BOTH HIPS: ICD-10-CM

## 2021-11-10 DIAGNOSIS — M19.90 ARTHRITIS: Primary | ICD-10-CM

## 2021-11-10 DIAGNOSIS — I10 ESSENTIAL HYPERTENSION: ICD-10-CM

## 2021-11-10 DIAGNOSIS — Z91.81 AT HIGH RISK FOR INJURY RELATED TO FALL: ICD-10-CM

## 2021-11-10 DIAGNOSIS — E78.49 OTHER HYPERLIPIDEMIA: ICD-10-CM

## 2021-11-10 DIAGNOSIS — R93.6 ABNORMAL FINDINGS ON DIAGNOSTIC IMAGING OF LIMBS: ICD-10-CM

## 2021-11-10 PROCEDURE — 99214 OFFICE O/P EST MOD 30 MIN: CPT | Performed by: STUDENT IN AN ORGANIZED HEALTH CARE EDUCATION/TRAINING PROGRAM

## 2021-11-10 SDOH — ECONOMIC STABILITY: FOOD INSECURITY: WITHIN THE PAST 12 MONTHS, YOU WORRIED THAT YOUR FOOD WOULD RUN OUT BEFORE YOU GOT MONEY TO BUY MORE.: NEVER TRUE

## 2021-11-10 SDOH — ECONOMIC STABILITY: FOOD INSECURITY: WITHIN THE PAST 12 MONTHS, THE FOOD YOU BOUGHT JUST DIDN'T LAST AND YOU DIDN'T HAVE MONEY TO GET MORE.: NEVER TRUE

## 2021-11-10 ASSESSMENT — SOCIAL DETERMINANTS OF HEALTH (SDOH): HOW HARD IS IT FOR YOU TO PAY FOR THE VERY BASICS LIKE FOOD, HOUSING, MEDICAL CARE, AND HEATING?: NOT VERY HARD

## 2021-11-10 NOTE — PROGRESS NOTES
Shimon Johnson (:  3/4/1933) is a 80 y.o. male, established patient follow up , here for evaluation of the following:  Hypertension and Hand Pain (trouble holding on to things)         ASSESSMENT/PLAN      1. Arthritis  Assessment & Plan:   Uncontrolled, Is using Tylenol for pain control patient with significant arthritis in hands bilaterally, not currently taking NSAIDs, will trial diclofenac cream, improved  strength hopefully with some decrease in inflammation, DIP nodules suggest osteoarthritis, follow-up as needed or in 3 months  Orders:  -     diclofenac sodium (VOLTAREN) 1 % GEL; Apply 2 g topically 4 times daily as needed for Pain, Topical, 4 TIMES DAILY PRN Starting Wed 11/10/2021, Disp-720 g, R-3, Normal  2. Essential hypertension  Assessment & Plan:   Well-controlled, Currently taking any medications and blood pressure is at goal of less than 140 over less than 90  Orders:  -     CBC WITH AUTO DIFFERENTIAL; Future  -     COMPREHENSIVE METABOLIC PANEL; Future  3. Other hyperlipidemia  Assessment & Plan:   Unclear control, continue current medications will get repeat lipid panel today  Orders:  -     LIPID PANEL; Future  4. Primary osteoarthritis of both hips  Assessment & Plan:   Borderline controlled, lifestyle modifications recommended he declined referral to Ortho for any injections, he did have significant improvement with physical therapy, he has been completing exercises at home and has been walking more, hip and leg x-ray did show considerable degenerative changes, will get DEXA scan to look for osteoporosis, can consider duloxetine in the future  Orders:  -     DEXA BONE DENSITY AXIAL SKELETON; Future  5. At high risk for injury related to fall  -     DEXA BONE DENSITY AXIAL SKELETON; Future  6. Abnormal findings on diagnostic imaging of limbs  -     DEXA BONE DENSITY AXIAL SKELETON; Future    Return in about 3 months (around 2/10/2022) for medicare annual wellness.          Subjective SUBJECTIVE/OBJECTIVE:  HPI    He is walking a lot more and doing PT exercises at home. He takes pain pills extra strength tylenol, off and on. He notes he is feeling much better. He still has pain in his hands bilaterally, he notes he is having trouble gripping things, he does have arthritis in his hands. He does not want to go to [facility] for hip injections. He likes to go to [place]. He denies any depression. He also goes to the [place]  Last visit with prior PCP was 6/17/2021, blood pressure at that time was 122/60, he did see [facility] pain management on 6/15/2021 and he was going to start injections in July his back, he had labs in September 2020 with hemoglobin A1c of 5.7, TSH within normal limits, hemoglobin is mildly low, PSA of 3.3, cholesterol within normal limits, BMP within normal limits except mildly low protein  Patient has been to to physical therapy sessions  Declined preventative screening identified as care gaps unless ordered through this visit      Past Medical History:  has a past medical history of Carotid artery stenosis and Skin cancer. Past Surgical History:  has a past surgical history that includes shoulder surgery and knee surgery. Social History:  reports that he quit smoking about 74 years ago. His smoking use included cigarettes. He started smoking about 77 years ago. He has a 0.75 pack-year smoking history. He has never used smokeless tobacco. He reports that he does not drink alcohol and does not use drugs. Family History: family history is not on file.   Allergies: Meclizine  Medications:   Current Outpatient Medications   Medication Sig Dispense Refill    diclofenac sodium (VOLTAREN) 1 % GEL Apply 2 g topically 4 times daily as needed for Pain 720 g 3    atorvastatin (LIPITOR) 80 MG tablet Take 80 mg by mouth daily      clopidogrel (PLAVIX) 75 MG tablet Take 75 mg by mouth daily      aspirin 81 MG chewable tablet Take 81 mg by mouth daily       No current facility-administered medications for this visit. Allergies: Meclizine     Review of Systems       Objective   /67 (Site: Right Upper Arm, Position: Sitting, Cuff Size: Medium Adult)   Pulse 67   Temp 98 °F (36.7 °C) (Temporal)   Resp 20   Ht 5' 6\" (1.676 m)   Wt 170 lb 12.8 oz (77.5 kg)   SpO2 99%   BMI 27.57 kg/m²       Lab Results   Component Value Date    LABA1C 5.7 09/16/2020       Physical Exam  Constitutional:       General: He is not in acute distress. Appearance: Normal appearance. HENT:      Head: Normocephalic and atraumatic. Right Ear: External ear normal.      Left Ear: External ear normal.      Nose: Nose normal.      Mouth/Throat:      Mouth: Mucous membranes are moist.   Eyes:      Extraocular Movements: Extraocular movements intact. Conjunctiva/sclera: Conjunctivae normal.   Cardiovascular:      Rate and Rhythm: Normal rate and regular rhythm. Heart sounds: No murmur heard. Pulmonary:      Effort: Pulmonary effort is normal.      Breath sounds: Normal breath sounds. No wheezing. Musculoskeletal:        Hands:    Neurological:      General: No focal deficit present. Mental Status: He is alert. Gait: Gait abnormal (Slow, calculated gait, good stability though). Psychiatric:         Mood and Affect: Mood normal.         Behavior: Behavior normal.             An electronic signature was used to authenticate this note. --Jose Velez MD       *NOTE: This report was transcribed using voice recognition software. Every effort was made to ensure accuracy; however, inadvertent computerized transcription errors may be present.

## 2021-11-10 NOTE — ASSESSMENT & PLAN NOTE
Uncontrolled, Is using Tylenol for pain control patient with significant arthritis in hands bilaterally, not currently taking NSAIDs, will trial diclofenac cream, improved  strength hopefully with some decrease in inflammation, DIP nodules suggest osteoarthritis, follow-up as needed or in 3 months

## 2021-11-10 NOTE — ASSESSMENT & PLAN NOTE
Borderline controlled, lifestyle modifications recommended he declined referral to Ortho for any injections, he did have significant improvement with physical therapy, he has been completing exercises at home and has been walking more, hip and leg x-ray did show considerable degenerative changes, will get DEXA scan to look for osteoporosis, can consider duloxetine in the future

## 2021-11-10 NOTE — TELEPHONE ENCOUNTER
----- Message from Colton Galarza MD sent at 11/10/2021 10:15 AM EST -----  Regarding: Please call patient let him know I ordered DEXA  Please call patient and let him know I ordered a test to look at his bone density based on the x-ray of his hip that we did last time, please let him know they will be calling him to schedule

## 2021-11-17 ENCOUNTER — HOSPITAL ENCOUNTER (OUTPATIENT)
Dept: MAMMOGRAPHY | Age: 86
Discharge: HOME | End: 2021-11-19
Payer: MEDICARE

## 2021-11-17 ENCOUNTER — TELEPHONE (OUTPATIENT)
Dept: FAMILY MEDICINE CLINIC | Age: 86
End: 2021-11-17

## 2021-11-17 DIAGNOSIS — M16.0 PRIMARY OSTEOARTHRITIS OF BOTH HIPS: ICD-10-CM

## 2021-11-17 DIAGNOSIS — Z91.81 AT HIGH RISK FOR INJURY RELATED TO FALL: ICD-10-CM

## 2021-11-17 DIAGNOSIS — R93.6 ABNORMAL FINDINGS ON DIAGNOSTIC IMAGING OF LIMBS: ICD-10-CM

## 2021-11-17 PROCEDURE — 77080 DXA BONE DENSITY AXIAL: CPT

## 2021-11-17 NOTE — TELEPHONE ENCOUNTER
----- Message from Mei Sethi MD sent at 11/17/2021  1:34 PM EST -----  Regarding: Bone density need frax and forearm image  Can you please call imaging and find out if they can do the FRAX score based on this bone mineral density for this patient, also I usually order forearm imaging because I know that with the degeneration you get abnormal bone density of lumbar spine and hip, I swear I put this in the comments, I cannot find anywhere in the order, can you please find out for the future how I can do this to make sure that he gets done, I know I have ordered other bone densities and put to complete his completed forearm in the comments, sure if this is being done, please help me find the correct procedure and also I thought the hospital paid to do the FRAX scoring but is not on the report, thanks

## 2021-11-17 NOTE — TELEPHONE ENCOUNTER
Malena Rogers called, Harley Hussein is working on this and will get back to office with more information

## 2022-02-10 ENCOUNTER — OFFICE VISIT (OUTPATIENT)
Dept: FAMILY MEDICINE CLINIC | Age: 87
End: 2022-02-10
Payer: MEDICARE

## 2022-02-10 VITALS
RESPIRATION RATE: 20 BRPM | HEART RATE: 76 BPM | BODY MASS INDEX: 26.9 KG/M2 | DIASTOLIC BLOOD PRESSURE: 72 MMHG | HEIGHT: 66 IN | OXYGEN SATURATION: 96 % | TEMPERATURE: 96.9 F | SYSTOLIC BLOOD PRESSURE: 117 MMHG | WEIGHT: 167.4 LBS

## 2022-02-10 DIAGNOSIS — Z00.00 ROUTINE GENERAL MEDICAL EXAMINATION AT A HEALTH CARE FACILITY: ICD-10-CM

## 2022-02-10 DIAGNOSIS — M25.562 CHRONIC PAIN OF LEFT KNEE: ICD-10-CM

## 2022-02-10 DIAGNOSIS — Z13.220 SCREENING FOR HYPERLIPIDEMIA: ICD-10-CM

## 2022-02-10 DIAGNOSIS — G89.29 CHRONIC PAIN OF LEFT KNEE: ICD-10-CM

## 2022-02-10 DIAGNOSIS — R63.4 WEIGHT LOSS: ICD-10-CM

## 2022-02-10 DIAGNOSIS — F32.9 REACTIVE DEPRESSION: ICD-10-CM

## 2022-02-10 DIAGNOSIS — M17.11 PRIMARY OSTEOARTHRITIS OF RIGHT KNEE: ICD-10-CM

## 2022-02-10 PROBLEM — I10 ESSENTIAL HYPERTENSION: Status: RESOLVED | Noted: 2021-09-28 | Resolved: 2022-02-10

## 2022-02-10 PROCEDURE — 1123F ACP DISCUSS/DSCN MKR DOCD: CPT | Performed by: STUDENT IN AN ORGANIZED HEALTH CARE EDUCATION/TRAINING PROGRAM

## 2022-02-10 PROCEDURE — G0438 PPPS, INITIAL VISIT: HCPCS | Performed by: STUDENT IN AN ORGANIZED HEALTH CARE EDUCATION/TRAINING PROGRAM

## 2022-02-10 PROCEDURE — G8484 FLU IMMUNIZE NO ADMIN: HCPCS | Performed by: STUDENT IN AN ORGANIZED HEALTH CARE EDUCATION/TRAINING PROGRAM

## 2022-02-10 PROCEDURE — 4040F PNEUMOC VAC/ADMIN/RCVD: CPT | Performed by: STUDENT IN AN ORGANIZED HEALTH CARE EDUCATION/TRAINING PROGRAM

## 2022-02-10 RX ORDER — DULOXETIN HYDROCHLORIDE 30 MG/1
30 CAPSULE, DELAYED RELEASE ORAL DAILY
Qty: 30 CAPSULE | Refills: 5 | Status: SHIPPED | OUTPATIENT
Start: 2022-02-10

## 2022-02-10 ASSESSMENT — PATIENT HEALTH QUESTIONNAIRE - PHQ9
10. IF YOU CHECKED OFF ANY PROBLEMS, HOW DIFFICULT HAVE THESE PROBLEMS MADE IT FOR YOU TO DO YOUR WORK, TAKE CARE OF THINGS AT HOME, OR GET ALONG WITH OTHER PEOPLE: 2
SUM OF ALL RESPONSES TO PHQ QUESTIONS 1-9: 4
8. MOVING OR SPEAKING SO SLOWLY THAT OTHER PEOPLE COULD HAVE NOTICED. OR THE OPPOSITE, BEING SO FIGETY OR RESTLESS THAT YOU HAVE BEEN MOVING AROUND A LOT MORE THAN USUAL: 0
7. TROUBLE CONCENTRATING ON THINGS, SUCH AS READING THE NEWSPAPER OR WATCHING TELEVISION: 0
2. FEELING DOWN, DEPRESSED OR HOPELESS: 3
5. POOR APPETITE OR OVEREATING: 0
9. THOUGHTS THAT YOU WOULD BE BETTER OFF DEAD, OR OF HURTING YOURSELF: 1
4. FEELING TIRED OR HAVING LITTLE ENERGY: 0
6. FEELING BAD ABOUT YOURSELF - OR THAT YOU ARE A FAILURE OR HAVE LET YOURSELF OR YOUR FAMILY DOWN: 0
SUM OF ALL RESPONSES TO PHQ QUESTIONS 1-9: 4
SUM OF ALL RESPONSES TO PHQ QUESTIONS 1-9: 4
SUM OF ALL RESPONSES TO PHQ QUESTIONS 1-9: 3
3. TROUBLE FALLING OR STAYING ASLEEP: 0

## 2022-02-10 ASSESSMENT — COLUMBIA-SUICIDE SEVERITY RATING SCALE - C-SSRS
3. HAVE YOU BEEN THINKING ABOUT HOW YOU MIGHT KILL YOURSELF?: YES
6. HAVE YOU EVER DONE ANYTHING, STARTED TO DO ANYTHING, OR PREPARED TO DO ANYTHING TO END YOUR LIFE?: YES
7. DID THIS OCCUR IN THE LAST THREE MONTHS: NO
4. HAVE YOU HAD THESE THOUGHTS AND HAD SOME INTENTION OF ACTING ON THEM?: NO
2. HAVE YOU ACTUALLY HAD ANY THOUGHTS OF KILLING YOURSELF?: YES
1. WITHIN THE PAST MONTH, HAVE YOU WISHED YOU WERE DEAD OR WISHED YOU COULD GO TO SLEEP AND NOT WAKE UP?: YES
5. HAVE YOU STARTED TO WORK OUT OR WORKED OUT THE DETAILS OF HOW TO KILL YOURSELF? DO YOU INTEND TO CARRY OUT THIS PLAN?: NO

## 2022-02-10 NOTE — PROGRESS NOTES
Medicare Annual Wellness Visit  Name: Garland Mcdaniel Date: 2/10/2022   MRN: [id number] Sex: Male   Age: 80 y.o. Ethnicity: Non- / Non    : 3/4/1933 Race: White (non-)      Rufino Calix is here for Medicare AWV    Screenings for behavioral, psychosocial and functional/safety risks, and cognitive dysfunction are all negative except as indicated below. These results, as well as other patient data from the [address] [facility] [address] form, are documented in Flowsheets linked to this Encounter. Allergies   Allergen Reactions    Meclizine Other (See Comments)     \"couldn't stay awake\"         Prior to Visit Medications    Medication Sig Taking? Authorizing Provider   DULoxetine (CYMBALTA) 30 MG extended release capsule Take 1 capsule by mouth daily Yes Michaela Verma MD   diclofenac sodium (VOLTAREN) 1 % GEL Apply 2 g topically 4 times daily as needed for Pain Yes Michaela Verma MD   atorvastatin (LIPITOR) 80 MG tablet Take 80 mg by mouth daily Yes Historical Provider, MD   clopidogrel (PLAVIX) 75 MG tablet Take 75 mg by mouth daily Yes Historical Provider, MD   aspirin 81 MG chewable tablet Take 81 mg by mouth daily Yes Historical Provider, MD         Past Medical History:   Diagnosis Date    Carotid artery stenosis     Skin cancer 2021       Past Surgical History:   Procedure Laterality Date    KNEE SURGERY      L knee surgery    SHOULDER SURGERY      Bilateral shoulder surgery       History reviewed. No pertinent family history.     CareTeam (Including outside providers/suppliers regularly involved in providing care):   Patient Care Team:  Michaela Verma MD as PCP - General (Family Medicine)  Michaela Verma MD as PCP - REHABILITATION [facility] Empaneled Provider    Wt Readings from Last 3 Encounters:   02/10/22 167 lb 6.4 oz (75.9 kg)   11/10/21 170 lb 12.8 oz (77.5 kg)   21 173 lb (78.5 kg)     Vitals:    02/10/22 1022   BP: 117/72   Site: Right Upper Arm   Position: Sitting   Cuff Size: Large Adult   Pulse: 76   Resp: 20   Temp: 96.9 °F (36.1 °C)   TempSrc: Temporal   SpO2: 96%   Weight: 167 lb 6.4 oz (75.9 kg)   Height: 5' 6\" (1.676 m)     Body mass index is 27.02 kg/m². Based upon direct observation of the patient, evaluation of cognition reveals recent and remote memory intact. General Appearance: alert and oriented to person, place and time, well developed and well- nourished, in no acute distress  Skin: warm and dry, no rash or erythema  Head: normocephalic and atraumatic  Eyes: pupils equal, round, and reactive to light, extraocular eye movements intact, conjunctivae normal  ENT: tympanic membrane, external ear and ear canal normal bilaterally, nose without deformity, nasal mucosa and turbinates normal without polyps  Neck: supple and non-tender without mass, no thyromegaly or thyroid nodules, no cervical lymphadenopathy  Pulmonary/Chest: clear to auscultation bilaterally- no wheezes, rales or rhonchi, normal air movement, no respiratory distress  Cardiovascular: normal rate, regular rhythm, normal S1 and S2, no murmurs, rubs, clicks, or gallops, distal pulses intact, no carotid bruits  Abdomen: soft, non-tender, non-distended, normal bowel sounds, no masses or organomegaly  Extremities: no cyanosis, clubbing or edema  Musculoskeletal: normal range of motion, no joint swelling, deformity or tenderness  Neurologic: reflexes normal and symmetric, no cranial nerve deficit, gait, coordination and speech normal    Patient's complete Health Risk Assessment and screening values have been reviewed and are found in Flowsheets. The following problems were reviewed today and where indicated follow up appointments were made and/or referrals ordered. Positive Risk Factor Screenings with Interventions:              General Health and ACP:  General  In general, how would you say your health is?: Very Good  In the past 7 days, have you experienced any of the following?  New or Increased Pain, New or Increased Fatigue, Loneliness, Social Isolation, Stress or Anger?: None of These  Do you get the social and emotional support that you need?: (!) No  Do you have a Living Will?: Yes  Advance Directives     Power of [place] Will ACP-Advance Directive ACP-Power of     Not on File Not on File Not on File Not on File      General Health Risk Interventions:  · Social isolation: patient declines any further intervention for this issue    Health Habits/Nutrition:  Health Habits/Nutrition  Do you exercise for at least 20 minutes 2-3 times per week?: (!) No  Have you lost any weight without trying in the past 3 months?: No  Do you eat only one meal per day?: (!) Yes  Have you seen the dentist within the past year?: (!) No  Body mass index: (!) 27.02  Health Habits/Nutrition Interventions:  · Inadequate physical activity:  patient is not ready to increase his/her physical activity level at this time  · Nutritional issues:  educational materials for healthy, well-balanced diet provided  · Dental exam overdue:  patient encouraged to make appointment with his/her dentist    Hearing/Vision:  No exam data present  Hearing/Vision  Do you or your family notice any trouble with your hearing that hasn't been managed with hearing aids?: (!) Yes (pt has hearing wid)  Do you have difficulty driving, watching TV, or doing any of your daily activities because of your eyesight?: No  Have you had an eye exam within the past year?: Yes  Hearing/Vision Interventions:  · Hearing concerns:  patient declines any further evaluation/treatment for hearing issues  · Vision concerns:  patient encouraged to make appointment with his/her eye specialist    Safety:  Safety  Do you have working smoke detectors?: (!) No  Have all throw rugs been removed or fastened?: Yes  Do you have non-slip mats or surfaces in all bathtubs/showers?: (!) No  Do all of your stairways have a railing or banister?: Yes  Are your doorways, halls and stairs free of clutter?: (!) No  Do you always fasten your seatbelt when you are in a car?: Yes  Safety Interventions:  · Patient declines any further evaluation/treatment for this issue       Personalized Preventive Plan   Current Health Maintenance Status  Immunization History   Administered Date(s) Administered    COVID-19, Elizabeth Alexandra, Primary or Immunocompromised, PF, 100mcg/0.5mL 01/14/2021, 02/11/2021, 10/14/2021    Influenza Virus Vaccine 11/05/2009, 10/01/2012, 09/01/2013, 11/06/2015, 11/21/2016, 09/08/2017, 10/16/2018, 09/12/2019, 09/01/2020, 10/26/2020    Influenza, Quadv, adjuvanted, 65 yrs +, IM, PF (Fluad) 09/28/2021    Pneumococcal Conjugate 13-valent ([id number]) 09/17/2015, 11/01/2015    Pneumococcal Polysaccharide (Cemilotzh95) 11/01/2009    Pneumococcal Vaccine 11/05/2009    Tdap (Boostrix, Adacel) 05/20/2013    Zoster Recombinant (Shingrix) 12/08/2020, 02/01/2021, 04/04/2021, 04/15/2021        Health Maintenance   Topic Date Due    Annual Wellness Visit (AWV)  Never done    Lipid screen  09/16/2021    Potassium monitoring  09/16/2021    Creatinine monitoring  09/16/2021    Depression Monitoring  09/28/2022    DTaP/Tdap/Td vaccine (2 - Td or Tdap) 05/20/2023    Flu vaccine  Completed    Shingles Vaccine  Completed    Pneumococcal 65+ years Vaccine  Completed    COVID-19 Vaccine  Completed    Hepatitis A vaccine  Aged Out    Hepatitis B vaccine  Aged Out    Hib vaccine  Aged Out    Meningococcal (ACWY) vaccine  Aged Out     Recommendations for [employer] Due: see orders and patient instructions/AVS.  . Recommended screening schedule for the next 5-10 years is provided to the patient in written form: see Patient Instructions/AVS.    Jane Closs was seen today for medicare awv. Diagnoses and all orders for this visit:    Jane Closs also is seen at the [place].   Today he was having issues with his mood as he hemisections house for 10 days, is agreeable to start duloxetine today in order to help with the chronic pain and also depressive symptoms, he did lose his wife last year, he declined any referrals for [facility] or to help with meals or shoveling his driveway    Reactive depression  Acute, not well controlled, positive depression screening with suicidal ideation, no plan, contracts for safety, notes he [place] have friends [place] the [place] and during the summer social support, he feels his current symptoms are because he is stuck in his house for 10 days, he plans to get out this weekend and visit with friends, will start duloxetine at 30 mg due to age, follow-up in 4 weeks  -     DULoxetine (CYMBALTA) 30 MG extended release capsule; Take 1 capsule by mouth daily    Screening for hyperlipidemia  -     Lipid, Fasting; Future    Routine general medical examination at a health care facility    Weight loss  Subacute, unclear control, patient with weight loss due to decreased appetite, improving his mood may help, he does all his own cooking, follow-up in 4 weeks for weight check    Primary osteoarthritis of right knee  Chronic, stable, imaging has shown degenerative changes, he declines physical therapy hopefully Cymbalta will help, advised increased movement reactivity  -     DULoxetine (CYMBALTA) 30 MG extended release capsule; Take 1 capsule by mouth daily    Chronic pain of left knee  -     DULoxetine (CYMBALTA) 30 MG extended release capsule; Take 1 capsule by mouth daily                   Advance Care Planning   Advanced Care Planning: Discussed the patients choices for care and treatment in case of a health event that adversely affects decision-making abilities. Also discussed the patients long-term treatment options. Reviewed with the patient the [facility] of [address] [place] [address] Declaration forms  Reviewed the process of designating a competent adult as an Agent (or -in-fact) that could take make health care decisions for the patient if incompetent.  Patient was asked to complete the declaration forms, either acknowledge the forms by a public notary or an eligible witness and provide a signed copy to the practice office. Time spent (minutes):2m  He will give it to us it is on file tat the [place]. Cardiovascular Disease Risk Counseling: Assessed the patient's risk to develop cardiovascular disease and reviewed main risk factors. Reviewed steps to reduce disease risk including:   · Quitting tobacco use, reducing amount smoked, or not starting the habit  · Making healthy food choices  · Being physically active and gradualy increasing activity levels   · Reduce weight and determine a healthy BMI goal  · Monitor blood pressure and treat if higher than 140/90 mmHg  · Maintain blood total cholesterol levels under 5 mmol/l or 190 mg/dl  · Maintain LDL cholesterol levels under 3.0 mmol/l or 115 mg/dl   · Control blood glucose levels  · Consider taking aspirin (75 mg daily), once blood pressure is controlled   Provided a follow up plan. Time spent (minutes): 1    Tobacco Cessation Counseling: Patient advised about behavior change, including information about personal health harms, usage of appropriate cessation measures and benefits of cessation. Time spent (minutes): 1    Electronically signed by Argentina Hardin MD on 2/10/2022 at 12:27 PM          *NOTE: This report was transcribed using voice recognition software. Every effort was made to ensure accuracy; however, inadvertent computerized transcription errors may be present.

## 2022-02-10 NOTE — PATIENT INSTRUCTIONS
Patient had sent [employer] message yesterday stating needing 90 day sent to [employer]; didn't receive mail order.  Prescription sent.  Judith TARIQ RN   Personalized Preventive Plan for Adin Quintanilla - 2/10/2022  Medicare offers a range of preventive health benefits. Some of the tests and screenings are paid in full while other may be subject to a deductible, co-insurance, and/or copay. Some of these benefits include a comprehensive review of your medical history including lifestyle, illnesses that may run in your family, and various assessments and screenings as appropriate. After reviewing your medical record and screening and assessments performed today your provider may have ordered immunizations, labs, imaging, and/or referrals for you. A list of these orders (if applicable) as well as your Preventive Care list are included within your After Visit Summary for your review. Other Preventive Recommendations:    · A preventive eye exam performed by an eye specialist is recommended every 1-2 years to screen for glaucoma; cataracts, macular degeneration, and other eye disorders. · A preventive dental visit is recommended every 6 months. · Try to get at least 150 minutes of exercise per week or 10,000 steps per day on a pedometer . · Order or download the FREE \"Exercise & Physical Activity: Your Everyday Guide\" from [employer]. Call 2-881.154.4389 or search [employer] online. · You need 3286-0528 mg of calcium and 5996-0610 IU of vitamin D per day. It is possible to meet your calcium requirement with diet alone, but a vitamin D supplement is usually necessary to meet this goal.  · When exposed to the sun, use a sunscreen that protects against both UVA and UVB radiation with an SPF of 30 or greater. Reapply every 2 to 3 hours or after sweating, drying off with a towel, or swimming. · Always wear a seat belt when traveling in a car. Always wear a helmet when riding a bicycle or motorcycle.

## 2023-08-19 ENCOUNTER — APPOINTMENT (OUTPATIENT)
Dept: CT IMAGING | Age: 88
End: 2023-08-19
Payer: MEDICARE

## 2023-08-19 ENCOUNTER — APPOINTMENT (OUTPATIENT)
Dept: GENERAL RADIOLOGY | Age: 88
End: 2023-08-19
Payer: MEDICARE

## 2023-08-19 ENCOUNTER — HOSPITAL ENCOUNTER (EMERGENCY)
Age: 88
Discharge: HOME | End: 2023-08-19
Payer: MEDICARE

## 2023-08-19 VITALS
OXYGEN SATURATION: 99 % | DIASTOLIC BLOOD PRESSURE: 109 MMHG | HEART RATE: 79 BPM | TEMPERATURE: 97.1 F | SYSTOLIC BLOOD PRESSURE: 118 MMHG | RESPIRATION RATE: 18 BRPM

## 2023-08-19 DIAGNOSIS — S00.81XA ABRASION OF FACE, INITIAL ENCOUNTER: ICD-10-CM

## 2023-08-19 DIAGNOSIS — S80.212A ABRASION OF LEFT KNEE, INITIAL ENCOUNTER: ICD-10-CM

## 2023-08-19 DIAGNOSIS — S09.90XA INJURY OF HEAD, INITIAL ENCOUNTER: ICD-10-CM
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
## 2023-08-19 DIAGNOSIS — S60.222A CONTUSION OF LEFT HAND, INITIAL ENCOUNTER: ICD-10-CM

## 2023-08-19 PROCEDURE — 72125 CT NECK SPINE W/O DYE: CPT

## 2023-08-19 PROCEDURE — 96372 THER/PROPH/DIAG INJ SC/IM: CPT

## 2023-08-19 PROCEDURE — 73130 X-RAY EXAM OF HAND: CPT

## 2023-08-19 PROCEDURE — 73080 X-RAY EXAM OF ELBOW: CPT

## 2023-08-19 PROCEDURE — 73562 X-RAY EXAM OF KNEE 3: CPT

## 2023-08-19 PROCEDURE — 6370000000 HC RX 637 (ALT 250 FOR IP): Performed by: PHYSICIAN ASSISTANT

## 2023-08-19 PROCEDURE — 6360000002 HC RX W HCPCS: Performed by: PHYSICIAN ASSISTANT

## 2023-08-19 PROCEDURE — 73590 X-RAY EXAM OF LOWER LEG: CPT

## 2023-08-19 PROCEDURE — 70450 CT HEAD/BRAIN W/O DYE: CPT

## 2023-08-19 PROCEDURE — 90471 IMMUNIZATION ADMIN: CPT | Performed by: PHYSICIAN ASSISTANT

## 2023-08-19 PROCEDURE — 90714 TD VACC NO PRESV 7 YRS+ IM: CPT | Performed by: PHYSICIAN ASSISTANT

## 2023-08-19 PROCEDURE — 99284 EMERGENCY DEPT VISIT MOD MDM: CPT

## 2023-08-19 PROCEDURE — 71250 CT THORAX DX C-: CPT

## 2023-08-19 PROCEDURE — 70486 CT MAXILLOFACIAL W/O DYE: CPT

## 2023-08-19 RX ORDER — ACETAMINOPHEN 500 MG
1000 TABLET ORAL ONCE
Status: COMPLETED | OUTPATIENT
Start: 2023-08-19 | End: 2023-08-19

## 2023-08-19 RX ADMIN — CLOSTRIDIUM TETANI TOXOID ANTIGEN (FORMALDEHYDE INACTIVATED) AND CORYNEBACTERIUM DIPHTHERIAE TOXOID ANTIGEN (FORMALDEHYDE INACTIVATED) 0.5 ML: 5; 2 INJECTION, SUSPENSION INTRAMUSCULAR at 12:11

## 2023-08-19 RX ADMIN — ACETAMINOPHEN 1000 MG: 500 TABLET ORAL at 12:10

## 2023-08-20 NOTE — ED PROVIDER NOTES
regarding the diagnosis and prognosis. Questions are answered at this time and are agreeable with the plan. Assessment      1. Fall, initial encounter    2. Injury of head, initial encounter    3. Abrasion of face, initial encounter    4. Contusion of left hand, initial encounter    5. Abrasion of left knee, initial encounter      Plan   Discharged home. Patient condition is stable    New Medications     Discharge Medication List as of 8/19/2023  2:56 PM        Electronically signed by Andi Atkins PA-C   DD: 8/20/23  **This report was transcribed using voice recognition software. Every effort was made to ensure accuracy; however, inadvertent computerized transcription errors may be present.   END OF ED PROVIDER NOTE      Andi Atkins PA-C  08/20/23 0274

## 2023-08-22 ENCOUNTER — TELEPHONE (OUTPATIENT)
Dept: FAMILY MEDICINE CLINIC | Age: 88
End: 2023-08-22

## 2023-08-30 ENCOUNTER — OFFICE VISIT (OUTPATIENT)
Dept: FAMILY MEDICINE CLINIC | Age: 88
End: 2023-08-30
Payer: MEDICARE

## 2023-08-30 VITALS
HEART RATE: 87 BPM | HEIGHT: 66 IN | TEMPERATURE: 96 F | WEIGHT: 171 LBS | BODY MASS INDEX: 27.48 KG/M2 | DIASTOLIC BLOOD PRESSURE: 78 MMHG | SYSTOLIC BLOOD PRESSURE: 120 MMHG | OXYGEN SATURATION: 98 % | RESPIRATION RATE: 20 BRPM

## 2023-08-30 DIAGNOSIS — Z71.89 ACP (ADVANCE CARE PLANNING): ICD-10-CM

## 2023-08-30 DIAGNOSIS — Z00.00 MEDICARE ANNUAL WELLNESS VISIT, SUBSEQUENT: Primary | ICD-10-CM
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
## 2023-08-30 DIAGNOSIS — M79.642 LEFT HAND PAIN: ICD-10-CM

## 2023-08-30 PROCEDURE — 1123F ACP DISCUSS/DSCN MKR DOCD: CPT | Performed by: STUDENT IN AN ORGANIZED HEALTH CARE EDUCATION/TRAINING PROGRAM

## 2023-08-30 PROCEDURE — G0439 PPPS, SUBSEQ VISIT: HCPCS | Performed by: STUDENT IN AN ORGANIZED HEALTH CARE EDUCATION/TRAINING PROGRAM

## 2023-08-30 RX ORDER — NALOXONE HYDROCHLORIDE 4 MG/.1ML
SPRAY NASAL
COMMUNITY
Start: 2023-02-08

## 2023-08-30 SDOH — ECONOMIC STABILITY: FOOD INSECURITY: WITHIN THE PAST 12 MONTHS, YOU WORRIED THAT YOUR FOOD WOULD RUN OUT BEFORE YOU GOT MONEY TO BUY MORE.: NEVER TRUE

## 2023-08-30 SDOH — ECONOMIC STABILITY: HOUSING INSECURITY
IN THE LAST 12 MONTHS, WAS THERE A TIME WHEN YOU DID NOT HAVE A STEADY PLACE TO SLEEP OR SLEPT IN A SHELTER (INCLUDING NOW)?: NO

## 2023-08-30 SDOH — ECONOMIC STABILITY: INCOME INSECURITY: HOW HARD IS IT FOR YOU TO PAY FOR THE VERY BASICS LIKE FOOD, HOUSING, MEDICAL CARE, AND HEATING?: NOT HARD AT ALL

## 2023-08-30 SDOH — ECONOMIC STABILITY: FOOD INSECURITY: WITHIN THE PAST 12 MONTHS, THE FOOD YOU BOUGHT JUST DIDN'T LAST AND YOU DIDN'T HAVE MONEY TO GET MORE.: NEVER TRUE

## 2023-08-30 ASSESSMENT — PATIENT HEALTH QUESTIONNAIRE - PHQ9
2. FEELING DOWN, DEPRESSED OR HOPELESS: 0
SUM OF ALL RESPONSES TO PHQ QUESTIONS 1-9: 0
SUM OF ALL RESPONSES TO PHQ9 QUESTIONS 1 & 2: 0
SUM OF ALL RESPONSES TO PHQ QUESTIONS 1-9: 0
8. MOVING OR SPEAKING SO SLOWLY THAT OTHER PEOPLE COULD HAVE NOTICED. OR THE OPPOSITE, BEING SO FIGETY OR RESTLESS THAT YOU HAVE BEEN MOVING AROUND A LOT MORE THAN USUAL: 0
9. THOUGHTS THAT YOU WOULD BE BETTER OFF DEAD, OR OF HURTING YOURSELF: 0
5. POOR APPETITE OR OVEREATING: 0
7. TROUBLE CONCENTRATING ON THINGS, SUCH AS READING THE NEWSPAPER OR WATCHING TELEVISION: 0
SUM OF ALL RESPONSES TO PHQ QUESTIONS 1-9: 0
3. TROUBLE FALLING OR STAYING ASLEEP: 0
1. LITTLE INTEREST OR PLEASURE IN DOING THINGS: 0
SUM OF ALL RESPONSES TO PHQ QUESTIONS 1-9: 0
10. IF YOU CHECKED OFF ANY PROBLEMS, HOW DIFFICULT HAVE THESE PROBLEMS MADE IT FOR YOU TO DO YOUR WORK, TAKE CARE OF THINGS AT HOME, OR GET ALONG WITH OTHER PEOPLE: 0
6. FEELING BAD ABOUT YOURSELF - OR THAT YOU ARE A FAILURE OR HAVE LET YOURSELF OR YOUR FAMILY DOWN: 0
4. FEELING TIRED OR HAVING LITTLE ENERGY: 0

## 2023-08-30 ASSESSMENT — LIFESTYLE VARIABLES
HOW OFTEN DO YOU HAVE A DRINK CONTAINING ALCOHOL: NEVER
HOW MANY STANDARD DRINKS CONTAINING ALCOHOL DO YOU HAVE ON A TYPICAL DAY: PATIENT DOES NOT DRINK

## 2023-08-31 PROBLEM — M25.551 PAIN IN RIGHT HIP: Status: ACTIVE | Noted: 2023-08-31

## 2023-08-31 PROBLEM — I65.29 CAROTID ARTERY STENOSIS: Status: ACTIVE | Noted: 2023-08-31

## 2023-08-31 PROBLEM — M25.559 ARTHRALGIA OF HIP: Status: ACTIVE | Noted: 2023-08-31

## 2023-08-31 PROBLEM — M54.16 LUMBAR RADICULOPATHY: Status: ACTIVE | Noted: 2023-08-31

## 2023-08-31 PROBLEM — M17.12 OSTEOARTHRITIS OF LEFT KNEE: Status: ACTIVE | Noted: 2023-08-31

## 2023-08-31 PROBLEM — E78.5 HYPERLIPIDEMIA: Status: ACTIVE | Noted: 2023-08-31

## 2023-08-31 PROBLEM — M51.36 DEGENERATION OF INTERVERTEBRAL DISC OF LUMBAR REGION: Status: ACTIVE | Noted: 2023-08-31

## 2023-08-31 PROBLEM — E78.49 OTHER HYPERLIPIDEMIA: Status: RESOLVED | Noted: 2021-09-28 | Resolved: 2023-08-31

## 2023-08-31 PROBLEM — H35.3111 NONEXUDATIVE AGE-RELATED MACULAR DEGENERATION, RIGHT EYE, EARLY DRY STAGE: Status: ACTIVE | Noted: 2023-08-31

## 2023-08-31 PROBLEM — M19.049 DEGENERATIVE JOINT DISEASE OF HAND: Status: ACTIVE | Noted: 2023-08-31

## 2023-08-31 PROBLEM — M25.569 KNEE PAIN: Status: ACTIVE | Noted: 2023-08-31

## 2023-08-31 PROBLEM — G56.00 CARPAL TUNNEL SYNDROME: Status: ACTIVE | Noted: 2023-08-31

## 2023-12-08 ENCOUNTER — OFFICE VISIT (OUTPATIENT)
Dept: FAMILY MEDICINE CLINIC | Age: 88
End: 2023-12-08
Payer: MEDICARE

## 2023-12-08 VITALS
SYSTOLIC BLOOD PRESSURE: 118 MMHG | WEIGHT: 182 LBS | OXYGEN SATURATION: 97 % | DIASTOLIC BLOOD PRESSURE: 68 MMHG | HEART RATE: 81 BPM | TEMPERATURE: 98.1 F | BODY MASS INDEX: 29.25 KG/M2 | HEIGHT: 66 IN

## 2023-12-08 DIAGNOSIS — R63.5 WEIGHT GAIN: ICD-10-CM

## 2023-12-08 DIAGNOSIS — Z23 NEED FOR INFLUENZA VACCINATION: ICD-10-CM

## 2023-12-08 DIAGNOSIS — H90.3 SENSORINEURAL HEARING LOSS (SNHL) OF BOTH EARS: ICD-10-CM

## 2023-12-08 DIAGNOSIS — M19.90 ARTHRITIS: ICD-10-CM

## 2023-12-08 DIAGNOSIS — M54.50 LUMBAR BACK PAIN: ICD-10-CM

## 2023-12-08 DIAGNOSIS — M25.562 CHRONIC PAIN OF LEFT KNEE: Primary | ICD-10-CM

## 2023-12-08 DIAGNOSIS — E78.2 MIXED HYPERLIPIDEMIA: ICD-10-CM

## 2023-12-08 DIAGNOSIS — H61.21 IMPACTED CERUMEN OF RIGHT EAR: ICD-10-CM

## 2023-12-08 DIAGNOSIS — G89.29 CHRONIC PAIN OF LEFT KNEE: Primary | ICD-10-CM

## 2023-12-08 PROBLEM — H91.90 HEARING LOSS: Status: ACTIVE | Noted: 2023-12-08

## 2023-12-08 LAB
ABSOLUTE IMMATURE GRANULOCYTE: 0.03 K/UL (ref 0–0.58)
ALBUMIN SERPL-MCNC: 3.9 G/DL (ref 3.5–5.2)
ALP BLD-CCNC: 106 U/L (ref 40–129)
ALT SERPL-CCNC: 16 U/L (ref 0–40)
ANION GAP SERPL CALCULATED.3IONS-SCNC: 15 MMOL/L (ref 7–16)
AST SERPL-CCNC: 18 U/L (ref 0–39)
BASOPHILS ABSOLUTE: 0.05 K/UL (ref 0–0.2)
BASOPHILS RELATIVE PERCENT: 1 % (ref 0–2)
BILIRUB SERPL-MCNC: 0.2 MG/DL (ref 0–1.2)
BUN BLDV-MCNC: 16 MG/DL (ref 6–23)
CALCIUM SERPL-MCNC: 9.5 MG/DL (ref 8.6–10.2)
CHLORIDE BLD-SCNC: 98 MMOL/L (ref 98–107)
CHOLESTEROL: 158 MG/DL
CO2: 21 MMOL/L (ref 22–29)
CREAT SERPL-MCNC: 1 MG/DL (ref 0.7–1.2)
EOSINOPHILS ABSOLUTE: 0.24 K/UL (ref 0.05–0.5)
EOSINOPHILS RELATIVE PERCENT: 3 % (ref 0–6)
GFR SERPL CREATININE-BSD FRML MDRD: >60 ML/MIN/1.73M2
GLUCOSE BLD-MCNC: 90 MG/DL (ref 74–99)
HCT VFR BLD CALC: 45.1 % (ref 37–54)
HDLC SERPL-MCNC: 35 MG/DL
HEMOGLOBIN: 14.7 G/DL (ref 12.5–16.5)
IMMATURE GRANULOCYTES: 0 % (ref 0–5)
LDL CHOLESTEROL: 93 MG/DL
LYMPHOCYTES ABSOLUTE: 2.24 K/UL (ref 1.5–4)
LYMPHOCYTES RELATIVE PERCENT: 28 % (ref 20–42)
MCH RBC QN AUTO: 31.4 PG (ref 26–35)
MCHC RBC AUTO-ENTMCNC: 32.6 G/DL (ref 32–34.5)
MCV RBC AUTO: 96.4 FL (ref 80–99.9)
MONOCYTES ABSOLUTE: 0.82 K/UL (ref 0.1–0.95)
MONOCYTES RELATIVE PERCENT: 10 % (ref 2–12)
NEUTROPHILS ABSOLUTE: 4.63 K/UL (ref 1.8–7.3)
NEUTROPHILS RELATIVE PERCENT: 58 % (ref 43–80)
PDW BLD-RTO: 13.2 % (ref 11.5–15)
PLATELET # BLD: 229 K/UL (ref 130–450)
PMV BLD AUTO: 9.4 FL (ref 7–12)
POTASSIUM SERPL-SCNC: 4.8 MMOL/L (ref 3.5–5)
RBC # BLD: 4.68 M/UL (ref 3.8–5.8)
SODIUM BLD-SCNC: 134 MMOL/L (ref 132–146)
TOTAL PROTEIN: 6.8 G/DL (ref 6.4–8.3)
TRIGL SERPL-MCNC: 151 MG/DL
TSH SERPL DL<=0.05 MIU/L-ACNC: 1.64 UIU/ML (ref 0.27–4.2)
VLDLC SERPL CALC-MCNC: 30 MG/DL
WBC # BLD: 8 K/UL (ref 4.5–11.5)

## 2023-12-08 PROCEDURE — G0008 ADMIN INFLUENZA VIRUS VAC: HCPCS | Performed by: STUDENT IN AN ORGANIZED HEALTH CARE EDUCATION/TRAINING PROGRAM

## 2023-12-08 PROCEDURE — 99214 OFFICE O/P EST MOD 30 MIN: CPT | Performed by: STUDENT IN AN ORGANIZED HEALTH CARE EDUCATION/TRAINING PROGRAM

## 2023-12-08 PROCEDURE — 90694 VACC AIIV4 NO PRSRV 0.5ML IM: CPT | Performed by: STUDENT IN AN ORGANIZED HEALTH CARE EDUCATION/TRAINING PROGRAM

## 2023-12-08 PROCEDURE — 1123F ACP DISCUSS/DSCN MKR DOCD: CPT | Performed by: STUDENT IN AN ORGANIZED HEALTH CARE EDUCATION/TRAINING PROGRAM

## 2023-12-08 RX ORDER — PREDNISONE 20 MG/1
40 TABLET ORAL DAILY
Qty: 10 TABLET | Refills: 0 | Status: SHIPPED | OUTPATIENT
Start: 2023-12-08 | End: 2023-12-13

## 2023-12-08 ASSESSMENT — ENCOUNTER SYMPTOMS
ABDOMINAL PAIN: 0
TROUBLE SWALLOWING: 0
SHORTNESS OF BREATH: 0
BACK PAIN: 1

## 2023-12-08 NOTE — PROGRESS NOTES
Leveda Bence (:  3/4/1933) is a 80 y.o. male, established patient follow up , here for evaluation of the following:  Arthritis and Follow-up Chronic Condition (Osteoarthritis  right knee )         ASSESSMENT/PLAN      1. Chronic pain of left knee  Chronic, exacerbated with effusion today, prednisone, will send back to [facility], Voltaren gel BID after prednisone, can consider PT in the future   -     predniSONE (DELTASONE) 20 MG tablet; Take 2 tablets by mouth daily for 5 days, Disp-10 tablet, R-0Normal  2. Mixed hyperlipidemia  Chronic, unclear control, will recheck now   -     Lipid Panel; Future  3. Arthritis  4. Lumbar back pain  Chronic has had injections through [facility], prednisone has helped as well and will call DR. Arreola   -     predniSONE (DELTASONE) 20 MG tablet; Take 2 tablets by mouth daily for 5 days, Disp-10 tablet, R-0Normal  5. Impacted cerumen of right ear  Sent to ENT  -     [facility]  6. Sensorineural hearing loss (SNHL) of both ears  Sent to ENT   7. Weight gain  -     CBC with Auto Differential; Future  -     Comprehensive Metabolic Panel; Future  -     TSH; Future    Return in about 6 months (around 2024) for Follow up chronic disease. Subjective   Portions of this note were completed by mitzi Aguayo during the course of the visit. All decision-making was completed by, and the note in its entirety was reviewed by myself, Ethan Garcia MD.    SUBJECTIVE/OBJECTIVE:  HPI  Pt is here for left knee pain and lower back pain  Worst in the morning. Gets better through out the day  Taking tylenol and sometimes put cream on his knee   Had spinal shots last year  Left knee swollen      Hand pain has improved     Wax in right ear.  Referral to ENT       [facility] 2023 - AWV had [facility] went to ED, was doing ok hand was painful     Declined preventative screening identified as care gaps unless ordered through this visit    PHQ2/PHQ9      No data recorded

## 2023-12-08 NOTE — PATIENT INSTRUCTIONS
Call Dr Tianna Shah for the back     Call [address] surgeon in [place], [place]  Address: [address], [facility], [address] [facility]  Hours: Open ? Closes 4:30? PM  Phone: [phone]

## 2023-12-26 ENCOUNTER — TELEPHONE (OUTPATIENT)
Dept: FAMILY MEDICINE CLINIC | Age: 88
End: 2023-12-26

## 2024-02-13 ENCOUNTER — TELEPHONE (OUTPATIENT)
Dept: FAMILY MEDICINE CLINIC | Age: 89
End: 2024-02-13

## 2024-02-13 DIAGNOSIS — H90.3 SENSORINEURAL HEARING LOSS (SNHL) OF BOTH EARS: Primary | ICD-10-CM

## 2024-02-13 NOTE — TELEPHONE ENCOUNTER
Pt needs a referral to Dr. Ping cedillo/ [facility], fax to [phone]